# Patient Record
Sex: FEMALE | Race: WHITE | NOT HISPANIC OR LATINO | Employment: OTHER | URBAN - NONMETROPOLITAN AREA
[De-identification: names, ages, dates, MRNs, and addresses within clinical notes are randomized per-mention and may not be internally consistent; named-entity substitution may affect disease eponyms.]

---

## 2020-01-01 ENCOUNTER — TELEPHONE (OUTPATIENT)
Dept: OBSTETRICS AND GYNECOLOGY | Facility: CLINIC | Age: 65
End: 2020-01-01

## 2020-01-01 ENCOUNTER — OFFICE VISIT (OUTPATIENT)
Dept: OBSTETRICS AND GYNECOLOGY | Facility: CLINIC | Age: 65
End: 2020-01-01

## 2020-01-01 ENCOUNTER — APPOINTMENT (OUTPATIENT)
Dept: MAMMOGRAPHY | Facility: HOSPITAL | Age: 65
End: 2020-01-01

## 2020-01-01 ENCOUNTER — APPOINTMENT (OUTPATIENT)
Dept: BONE DENSITY | Facility: HOSPITAL | Age: 65
End: 2020-01-01

## 2020-01-01 VITALS
HEIGHT: 65 IN | WEIGHT: 293 LBS | BODY MASS INDEX: 48.82 KG/M2 | SYSTOLIC BLOOD PRESSURE: 136 MMHG | DIASTOLIC BLOOD PRESSURE: 86 MMHG

## 2020-01-01 DIAGNOSIS — Z12.31 ENCOUNTER FOR SCREENING MAMMOGRAM FOR MALIGNANT NEOPLASM OF BREAST: ICD-10-CM

## 2020-01-01 DIAGNOSIS — Z01.419 ENCOUNTER FOR GYNECOLOGICAL EXAMINATION WITHOUT ABNORMAL FINDING: Primary | ICD-10-CM

## 2020-01-01 DIAGNOSIS — Z78.9 NON-SMOKER: ICD-10-CM

## 2020-01-01 DIAGNOSIS — N95.0 PMB (POSTMENOPAUSAL BLEEDING): ICD-10-CM

## 2020-01-01 DIAGNOSIS — Z78.0 ENCOUNTER FOR OSTEOPOROSIS SCREENING IN ASYMPTOMATIC POSTMENOPAUSAL PATIENT: ICD-10-CM

## 2020-01-01 DIAGNOSIS — Z13.820 ENCOUNTER FOR OSTEOPOROSIS SCREENING IN ASYMPTOMATIC POSTMENOPAUSAL PATIENT: ICD-10-CM

## 2020-01-01 LAB
GEN CATEG CVX/VAG CYTO-IMP: NORMAL
HPV I/H RISK 4 DNA CVX QL PROBE+SIG AMP: NOT DETECTED
LAB AP CASE REPORT: NORMAL
LAB AP GYN ADDITIONAL INFORMATION: NORMAL
PATH INTERP SPEC-IMP: NORMAL
STAT OF ADQ CVX/VAG CYTO-IMP: NORMAL

## 2020-01-01 PROCEDURE — 87624 HPV HI-RISK TYP POOLED RSLT: CPT | Performed by: NURSE PRACTITIONER

## 2020-01-01 PROCEDURE — G0101 CA SCREEN;PELVIC/BREAST EXAM: HCPCS | Performed by: NURSE PRACTITIONER

## 2020-01-01 PROCEDURE — G0123 SCREEN CERV/VAG THIN LAYER: HCPCS | Performed by: NURSE PRACTITIONER

## 2020-08-07 ENCOUNTER — OFFICE VISIT (OUTPATIENT)
Dept: INTERNAL MEDICINE | Facility: CLINIC | Age: 65
End: 2020-08-07

## 2020-08-07 VITALS
SYSTOLIC BLOOD PRESSURE: 152 MMHG | HEART RATE: 89 BPM | HEIGHT: 65 IN | RESPIRATION RATE: 17 BRPM | TEMPERATURE: 98.2 F | OXYGEN SATURATION: 98 % | WEIGHT: 293 LBS | BODY MASS INDEX: 48.82 KG/M2 | DIASTOLIC BLOOD PRESSURE: 92 MMHG

## 2020-08-07 DIAGNOSIS — E03.9 HYPOTHYROIDISM, UNSPECIFIED TYPE: ICD-10-CM

## 2020-08-07 DIAGNOSIS — Z11.59 ENCOUNTER FOR HEPATITIS C SCREENING TEST FOR LOW RISK PATIENT: ICD-10-CM

## 2020-08-07 DIAGNOSIS — F41.9 ANXIETY: Primary | ICD-10-CM

## 2020-08-07 DIAGNOSIS — R06.2 WHEEZE: ICD-10-CM

## 2020-08-07 DIAGNOSIS — G62.9 NEUROPATHY: ICD-10-CM

## 2020-08-07 DIAGNOSIS — N93.8 DYSFUNCTIONAL UTERINE BLEEDING: ICD-10-CM

## 2020-08-07 DIAGNOSIS — Z00.00 HEALTHCARE MAINTENANCE: ICD-10-CM

## 2020-08-07 DIAGNOSIS — F51.01 PRIMARY INSOMNIA: ICD-10-CM

## 2020-08-07 DIAGNOSIS — K21.9 GASTROESOPHAGEAL REFLUX DISEASE, ESOPHAGITIS PRESENCE NOT SPECIFIED: ICD-10-CM

## 2020-08-07 DIAGNOSIS — E66.01 MORBIDLY OBESE (HCC): ICD-10-CM

## 2020-08-07 DIAGNOSIS — M79.7 FIBROMYALGIA: ICD-10-CM

## 2020-08-07 DIAGNOSIS — F32.A DEPRESSION, UNSPECIFIED DEPRESSION TYPE: ICD-10-CM

## 2020-08-07 DIAGNOSIS — R05.3 CHRONIC COUGH: ICD-10-CM

## 2020-08-07 PROCEDURE — 99204 OFFICE O/P NEW MOD 45 MIN: CPT | Performed by: INTERNAL MEDICINE

## 2020-08-07 RX ORDER — ALPRAZOLAM 0.5 MG/1
0.5 TABLET ORAL DAILY PRN
Qty: 30 TABLET | Refills: 2 | Status: SHIPPED | OUTPATIENT
Start: 2020-08-07 | End: 2021-01-01 | Stop reason: SDUPTHER

## 2020-08-07 RX ORDER — ALPRAZOLAM 0.5 MG/1
0.5 TABLET ORAL AS NEEDED
COMMUNITY
End: 2020-08-07 | Stop reason: SDUPTHER

## 2020-08-07 RX ORDER — PANTOPRAZOLE SODIUM 40 MG/1
40 TABLET, DELAYED RELEASE ORAL DAILY
Qty: 30 TABLET | Refills: 5 | Status: SHIPPED | OUTPATIENT
Start: 2020-08-07 | End: 2021-01-01 | Stop reason: SDUPTHER

## 2020-08-07 RX ORDER — GABAPENTIN 300 MG/1
300 CAPSULE ORAL 3 TIMES DAILY
Qty: 90 CAPSULE | Refills: 2 | Status: SHIPPED | OUTPATIENT
Start: 2020-08-07 | End: 2021-01-01

## 2020-08-07 RX ORDER — LEVOTHYROXINE SODIUM 0.07 MG/1
75 TABLET ORAL DAILY
COMMUNITY
End: 2020-08-07 | Stop reason: SDUPTHER

## 2020-08-07 RX ORDER — AMITRIPTYLINE HYDROCHLORIDE 100 MG/1
100 TABLET, FILM COATED ORAL NIGHTLY
COMMUNITY
End: 2020-08-07 | Stop reason: SDUPTHER

## 2020-08-07 RX ORDER — ZOLPIDEM TARTRATE 10 MG/1
10 TABLET ORAL NIGHTLY PRN
COMMUNITY
End: 2020-08-07 | Stop reason: SDUPTHER

## 2020-08-07 RX ORDER — AMITRIPTYLINE HYDROCHLORIDE 100 MG/1
100 TABLET, FILM COATED ORAL NIGHTLY
Qty: 30 TABLET | Refills: 5 | Status: SHIPPED | OUTPATIENT
Start: 2020-08-07 | End: 2021-01-01 | Stop reason: SDUPTHER

## 2020-08-07 RX ORDER — LEVOTHYROXINE SODIUM 0.07 MG/1
75 TABLET ORAL DAILY
Qty: 30 TABLET | Refills: 5 | Status: SHIPPED | OUTPATIENT
Start: 2020-08-07 | End: 2021-01-01 | Stop reason: SDUPTHER

## 2020-08-07 RX ORDER — ZOLPIDEM TARTRATE 10 MG/1
10 TABLET ORAL NIGHTLY PRN
Qty: 30 TABLET | Refills: 5 | Status: SHIPPED | OUTPATIENT
Start: 2020-08-07 | End: 2021-01-01 | Stop reason: SDUPTHER

## 2020-08-07 NOTE — PATIENT INSTRUCTIONS
-start gabapentin 300mg daily for one week then increase to three times a day  -you can mix hydrogen peroxide and water in a 50/50 split to help your ear wax.

## 2020-08-07 NOTE — PROGRESS NOTES
"Chief Complaint   Patient presents with   • Establish Care   • Ear Problem     Right ear stopped up   • Wheezing   • Med Refill         History:  Ángela Connell is a 64 y.o. female who presents today for evaluation of the above problems.      She presents today to establish care.  According to chart review she has a previous medical history for hypothyroidism, insomnia, anxiety, polyneuropathy, and recent medical history for wheezing and right ear issue.    She is from [place] but was living in [facility].  She moved back in March.    She has had numerous issues.  Currently, she has a chronic cough which started about 6 months ago.  She also has been having upper airway wheezing but no shortness of breath.  She had reflux for a long time and takes as needed omeprazole but not daily.    She has anxiety and panic attacks for which she takes Xanax \"only when I have to\".  She estimates taking 3-4 in a 1 week time span.  She gets nervous in cars and actually had to give up her 's license due to anxiety    She was diagnosed with polyneuropathy of her lower extremities.  She also has fibromyalgia but is not currently on any medication.  Gabapentin in the past was not successful in helping her and Lyrica caused too many side effects.    She reports her last colonoscopy is due in 2022.  Her mammogram was last done September 2018    She has had a D&C last year for dysfunctional uterine bleeding.  She was going to have more surgery but this had to be canceled due to a \"collapsed left lung\" and the fact that she moved back here to [place].    She is also having right ear stuffiness that did not resolve with over-the-counter earwax removal kit x1        HPI    ROS:  Review of Systems   Constitutional: Positive for activity change and fatigue. Negative for appetite change, fever and unexpected weight change.   HENT: Positive for hearing loss. Negative for ear discharge, nosebleeds, sore throat, tinnitus and trouble " swallowing.    Eyes: Negative for pain and visual disturbance.   Respiratory: Positive for cough and wheezing. Negative for chest tightness and shortness of breath.    Cardiovascular: Negative for chest pain, palpitations and leg swelling.   Gastrointestinal: Negative for abdominal pain, constipation, diarrhea, nausea and vomiting.        Reflux   Endocrine: Negative for cold intolerance and heat intolerance.   Genitourinary: Positive for vaginal bleeding. Negative for hematuria.   Musculoskeletal: Positive for arthralgias and myalgias. Negative for back pain, neck pain and neck stiffness.   Skin: Negative for rash and wound.   Neurological: Positive for numbness. Negative for dizziness, syncope, weakness, light-headedness and headaches.   Hematological: Negative for adenopathy. Does not bruise/bleed easily.   Psychiatric/Behavioral: Negative for agitation, behavioral problems and confusion.       No Known Allergies  Past Medical History:   Diagnosis Date   • Anxiety    • Polyneuropathy    • Thyroid disease      Past Surgical History:   Procedure Laterality Date   • GALLBLADDER SURGERY     • HYSTERECTOMY     • REPLACEMENT TOTAL KNEE       Family History   Problem Relation Age of Onset   • Cancer Mother    • Heart disease Mother    • Dementia Mother    • Cancer Father    • Heart disease Father    • Diabetes Sister    • Heart disease Maternal Grandfather    • Cancer Paternal Grandmother    • Heart disease Paternal Grandmother      Ángela  reports that she has never smoked. She has never used smokeless tobacco. She reports that she drinks alcohol. She reports that she does not use drugs.    I have reviewed and updated the above documentation (if necessary) including but not limited to chief complaint, ROS, PFSH, allergies and medications        Current Outpatient Medications:   •  ALPRAZolam (XANAX) 0.5 MG tablet, Take 1 tablet by mouth Daily As Needed for Anxiety., Disp: 30 tablet, Rfl: 2  •  amitriptyline (ELAVIL)  "100 MG tablet, Take 1 tablet by mouth Every Night., Disp: 30 tablet, Rfl: 5  •  levothyroxine (SYNTHROID, LEVOTHROID) 75 MCG tablet, Take 1 tablet by mouth Daily., Disp: 30 tablet, Rfl: 5  •  zolpidem (AMBIEN) 10 MG tablet, Take 1 tablet by mouth At Night As Needed for Sleep., Disp: 30 tablet, Rfl: 5  •  gabapentin (NEURONTIN) 300 MG capsule, Take 1 capsule by mouth 3 (Three) Times a Day., Disp: 90 capsule, Rfl: 2  •  pantoprazole (Protonix) 40 MG EC tablet, Take 1 tablet by mouth Daily., Disp: 30 tablet, Rfl: 5      OBJECTIVE:  Visit Vitals  /92 (BP Location: Left arm, Patient Position: Sitting, Cuff Size: Adult)   Pulse 89   Temp 98.2 °F (36.8 °C) (Oral)   Resp 17   Ht 165.1 cm (65\")   Wt (!) 140 kg (308 lb 9.6 oz)   SpO2 98%   Breastfeeding No   BMI 51.35 kg/m²      Physical Exam   Constitutional: She appears well-developed and well-nourished. No distress.   HENT:   Head: Normocephalic and atraumatic.   Right Ear: External ear normal.   Left Ear: External ear normal.   Left TM clear, right cerumen impaction   Eyes: Pupils are equal, round, and reactive to light. EOM are normal.   Neck: Phonation normal. No thyromegaly present.   Cardiovascular: Normal rate, regular rhythm, normal heart sounds and intact distal pulses. Exam reveals no friction rub.   No murmur heard.  No lower extremity edema   Pulmonary/Chest: Effort normal and breath sounds normal. No stridor. No respiratory distress. She has no wheezes. She has no rales. She exhibits no tenderness.   No wheezing at all, no stridor   Abdominal: Soft. Bowel sounds are normal. She exhibits no distension (Obese) and no mass. There is no tenderness. There is no rebound and no guarding.   Neurological: She is alert.   Skin: She is not diaphoretic. No erythema. No pallor.   Psychiatric: She has a normal mood and affect. Her behavior is normal. Judgment and thought content normal.   Vitals reviewed.      MDM    Assessment/Plan    Ángela was seen today for " establish care, ear problem, wheezing and med refill.    Diagnoses and all orders for this visit:    Anxiety  -     ALPRAZolam (XANAX) 0.5 MG tablet; Take 1 tablet by mouth Daily As Needed for Anxiety.    Hypothyroidism, unspecified type  -     levothyroxine (SYNTHROID, LEVOTHROID) 75 MCG tablet; Take 1 tablet by mouth Daily.  -     TSH; Future    Primary insomnia  -     zolpidem (AMBIEN) 10 MG tablet; Take 1 tablet by mouth At Night As Needed for Sleep.  -     amitriptyline (ELAVIL) 100 MG tablet; Take 1 tablet by mouth Every Night.    Depression, unspecified depression type    Fibromyalgia  -     gabapentin (NEURONTIN) 300 MG capsule; Take 1 capsule by mouth 3 (Three) Times a Day.    Wheeze  -     XR Chest PA & Lateral; Future    Chronic cough  -     XR Chest PA & Lateral; Future    Gastroesophageal reflux disease, esophagitis presence not specified  -     pantoprazole (Protonix) 40 MG EC tablet; Take 1 tablet by mouth Daily.    Morbidly obese (CMS/[facility])    Encounter for hepatitis C screening test for low risk patient  -     Hepatitis C Antibody; Future    Healthcare maintenance  -     CBC & Differential; Future  -     Comprehensive Metabolic Panel; Future  -     Hemoglobin A1c; Future  -     Lipid Panel; Future    Neuropathy  -     gabapentin (NEURONTIN) 300 MG capsule; Take 1 capsule by mouth 3 (Three) Times a Day.    Dysfunctional uterine bleeding  -     Ambulatory Referral to Gynecology      Given how her anxiety has affected her activities of daily living I will refill the Xanax.  She is not on a daily but does take 3-4 times per week.  I would like to check labs as above.    To help her neuropathy pain and fibromyalgia I am going to start gabapentin 300 mg 3 times a day.  She was instructed to start once a day for the first 1 week.    She has a chronic cough and wheeze which I suspect is from and treated reflux disease.  On exam she does not have any wheezing or stridor.  I would like to treat with Protonix  daily for 3 months.  I would like to check a chest x-ray as well.  If symptoms continue with treatment with PPI I will refer to ENT    We discussed her weight and she has difficult time losing weight due to hard time exercising.    Refer to gynecology for dysfunctional uterine bleeding with previous polypectomy    Follow-up 3 months or sooner if clinically indicated     Patient's Body mass index is 51.35 kg/m². BMI is above normal parameters. Recommendations include: exercise counseling and nutrition counseling.      Education materials and an After Visit Summary were printed and given to the patient at discharge.  Return in about 3 months (around 11/7/2020) for Recheck.         ELIZABETH Steen MD   1:38 PM  8/7/2020

## 2020-08-17 ENCOUNTER — HOSPITAL ENCOUNTER (OUTPATIENT)
Dept: GENERAL RADIOLOGY | Facility: HOSPITAL | Age: 65
Discharge: HOME | End: 2020-08-17
Admitting: INTERNAL MEDICINE

## 2020-08-17 ENCOUNTER — LAB (OUTPATIENT)
Dept: LAB | Facility: HOSPITAL | Age: 65
End: 2020-08-17

## 2020-08-17 DIAGNOSIS — E03.9 HYPOTHYROIDISM, UNSPECIFIED TYPE: ICD-10-CM

## 2020-08-17 DIAGNOSIS — R06.2 WHEEZE: ICD-10-CM

## 2020-08-17 DIAGNOSIS — Z11.59 ENCOUNTER FOR HEPATITIS C SCREENING TEST FOR LOW RISK PATIENT: ICD-10-CM

## 2020-08-17 DIAGNOSIS — Z00.00 HEALTHCARE MAINTENANCE: ICD-10-CM

## 2020-08-17 DIAGNOSIS — R05.3 CHRONIC COUGH: ICD-10-CM

## 2020-08-17 LAB
ALBUMIN SERPL-MCNC: 3.9 G/DL (ref 3.5–5.2)
ALBUMIN/GLOB SERPL: 1.3 G/DL
ALP SERPL-CCNC: 117 U/L (ref 39–117)
ALT SERPL W P-5'-P-CCNC: 16 U/L (ref 1–33)
ANION GAP SERPL CALCULATED.3IONS-SCNC: 10.7 MMOL/L (ref 5–15)
AST SERPL-CCNC: 13 U/L (ref 1–32)
BASOPHILS # BLD AUTO: 0.02 10*3/MM3 (ref 0–0.2)
BASOPHILS NFR BLD AUTO: 0.4 % (ref 0–1.5)
BILIRUB SERPL-MCNC: 0.4 MG/DL (ref 0–1.2)
BUN SERPL-MCNC: 8 MG/DL (ref 8–23)
BUN/CREAT SERPL: 8.8 (ref 7–25)
CALCIUM SPEC-SCNC: 9.1 MG/DL (ref 8.6–10.5)
CHLORIDE SERPL-SCNC: 102 MMOL/L (ref 98–107)
CHOLEST SERPL-MCNC: 218 MG/DL (ref 0–200)
CO2 SERPL-SCNC: 25.3 MMOL/L (ref 22–29)
CREAT SERPL-MCNC: 0.91 MG/DL (ref 0.57–1)
DEPRECATED RDW RBC AUTO: 40.3 FL (ref 37–54)
EOSINOPHIL # BLD AUTO: 0.1 10*3/MM3 (ref 0–0.4)
EOSINOPHIL NFR BLD AUTO: 1.9 % (ref 0.3–6.2)
ERYTHROCYTE [DISTWIDTH] IN BLOOD BY AUTOMATED COUNT: 13.1 % (ref 12.3–15.4)
GFR SERPL CREATININE-BSD FRML MDRD: 62 ML/MIN/1.73
GLOBULIN UR ELPH-MCNC: 3.1 GM/DL
GLUCOSE SERPL-MCNC: 96 MG/DL (ref 65–99)
HBA1C MFR BLD: 5.6 % (ref 4.8–5.6)
HCT VFR BLD AUTO: 43.7 % (ref 34–46.6)
HCV AB SER DONR QL: NORMAL
HDLC SERPL-MCNC: 53 MG/DL (ref 40–60)
HGB BLD-MCNC: 14.3 G/DL (ref 12–15.9)
IMM GRANULOCYTES # BLD AUTO: 0.02 10*3/MM3 (ref 0–0.05)
IMM GRANULOCYTES NFR BLD AUTO: 0.4 % (ref 0–0.5)
LDLC SERPL CALC-MCNC: 147 MG/DL (ref 0–100)
LDLC/HDLC SERPL: 2.78 {RATIO}
LYMPHOCYTES # BLD AUTO: 0.89 10*3/MM3 (ref 0.7–3.1)
LYMPHOCYTES NFR BLD AUTO: 17.2 % (ref 19.6–45.3)
MCH RBC QN AUTO: 28.5 PG (ref 26.6–33)
MCHC RBC AUTO-ENTMCNC: 32.7 G/DL (ref 31.5–35.7)
MCV RBC AUTO: 87.1 FL (ref 79–97)
MONOCYTES # BLD AUTO: 0.46 10*3/MM3 (ref 0.1–0.9)
MONOCYTES NFR BLD AUTO: 8.9 % (ref 5–12)
NEUTROPHILS NFR BLD AUTO: 3.67 10*3/MM3 (ref 1.7–7)
NEUTROPHILS NFR BLD AUTO: 71.2 % (ref 42.7–76)
NRBC BLD AUTO-RTO: 0 /100 WBC (ref 0–0.2)
PLATELET # BLD AUTO: 285 10*3/MM3 (ref 140–450)
PMV BLD AUTO: 10.3 FL (ref 6–12)
POTASSIUM SERPL-SCNC: 3.9 MMOL/L (ref 3.5–5.2)
PROT SERPL-MCNC: 7 G/DL (ref 6–8.5)
RBC # BLD AUTO: 5.02 10*6/MM3 (ref 3.77–5.28)
SODIUM SERPL-SCNC: 138 MMOL/L (ref 136–145)
TRIGL SERPL-MCNC: 89 MG/DL (ref 0–150)
TSH SERPL DL<=0.05 MIU/L-ACNC: 3.01 UIU/ML (ref 0.27–4.2)
VLDLC SERPL-MCNC: 17.8 MG/DL (ref 5–40)
WBC # BLD AUTO: 5.16 10*3/MM3 (ref 3.4–10.8)

## 2020-08-17 PROCEDURE — 80061 LIPID PANEL: CPT | Performed by: INTERNAL MEDICINE

## 2020-08-17 PROCEDURE — 86803 HEPATITIS C AB TEST: CPT | Performed by: INTERNAL MEDICINE

## 2020-08-17 PROCEDURE — 80053 COMPREHEN METABOLIC PANEL: CPT | Performed by: INTERNAL MEDICINE

## 2020-08-17 PROCEDURE — 71046 X-RAY EXAM CHEST 2 VIEWS: CPT

## 2020-08-17 PROCEDURE — 84443 ASSAY THYROID STIM HORMONE: CPT | Performed by: INTERNAL MEDICINE

## 2020-08-17 PROCEDURE — 36415 COLL VENOUS BLD VENIPUNCTURE: CPT

## 2020-08-17 PROCEDURE — 85025 COMPLETE CBC W/AUTO DIFF WBC: CPT | Performed by: INTERNAL MEDICINE

## 2020-08-17 PROCEDURE — 83036 HEMOGLOBIN GLYCOSYLATED A1C: CPT | Performed by: INTERNAL MEDICINE

## 2020-12-03 NOTE — PATIENT INSTRUCTIONS
"BMI for Adults  What is BMI?  Body mass index (BMI) is a number that is calculated from a person's weight and height. BMI can help estimate how much of a person's weight is composed of fat. BMI does not measure body fat directly. Rather, it is an alternative to procedures that directly measure body fat, which can be difficult and expensive.  BMI can help identify people who may be at higher risk for certain medical problems.  What are BMI measurements used for?  BMI is used as a screening tool to identify possible weight problems. It helps determine whether a person is obese, overweight, a healthy weight, or underweight.  BMI is useful for:  · Identifying a weight problem that may be related to a medical condition or may increase the risk for medical problems.  · Promoting changes, such as changes in diet and exercise, to help reach a healthy weight. BMI screening can be repeated to see if these changes are working.  How is BMI calculated?  BMI involves measuring your weight in relation to your height. Both height and weight are measured, and the BMI is calculated from those numbers. This can be done either in English (U[place] or metric measurements. Note that charts and online BMI calculators are available to help you find your BMI quickly and easily without having to do these calculations yourself.  To calculate your BMI in English (U.S[place] measurements:    1. Measure your weight in pounds (lb).  2. Multiply the number of pounds by 703.  ? For example, for a person who weighs 180 lb, multiply that number by 703, which equals 126,540.  3. Measure your height in inches. Then multiply that number by itself to get a measurement called \"inches squared.\"  ? For example, for a person who is 70 inches tall, the \"inches squared\" measurement is 70 inches x 70 inches, which equals 4,900 inches squared.  4. Divide the total from step 2 (number of lb x 703) by the total from step 3 (inches squared): 126,540 ÷ 4,900 = 25.8. This is " "your BMI.  To calculate your BMI in metric measurements:  1. Measure your weight in kilograms (kg).  2. Measure your height in meters (m). Then multiply that number by itself to get a measurement called \"meters squared.\"  ? For example, for a person who is 1.75 m tall, the \"meters squared\" measurement is 1.75 m x 1.75 m, which is equal to 3.1 meters squared.  3. Divide the number of kilograms (your weight) by the meters squared number. In this example: 70 ÷ 3.1 = 22.6. This is your BMI.  What do the results mean?  BMI charts are used to identify whether you are underweight, normal weight, overweight, or obese. The following guidelines will be used:  · Underweight: BMI less than 18.5.  · Normal weight: BMI between 18.5 and 24.9.  · Overweight: BMI between 25 and 29.9.  · Obese: BMI of 30 or above.  Keep these notes in mind:  · Weight includes both fat and muscle, so someone with a muscular build, such as an athlete, may have a BMI that is higher than 24.9. In cases like these, BMI is not an accurate measure of body fat.  · To determine if excess body fat is the cause of a BMI of 25 or higher, further assessments may need to be done by a health care provider.  · BMI is usually interpreted in the same way for men and women.  Where to find more information  For more information about BMI, including tools to quickly calculate your BMI, go to these websites:  · Centers for Disease Control and Prevention: www.cdc.gov  · American Heart Association: www.heart.org  · National Heart, Lung, and Blood [place]: www.nhlbi.nih.gov  Summary  · Body mass index (BMI) is a number that is calculated from a person's weight and height.  · BMI may help estimate how much of a person's weight is composed of fat. BMI can help identify those who may be at higher risk for certain medical problems.  · BMI can be measured using English measurements or metric measurements.  · BMI charts are used to identify whether you are underweight, normal " weight, overweight, or obese.  This information is not intended to replace advice given to you by your health care provider. Make sure you discuss any questions you have with your health care provider.  Document Revised: 09/09/2020 Document Reviewed: 07/17/2020  Elsevier Patient Education © 2020 [employer]

## 2021-01-01 ENCOUNTER — HOSPITAL ENCOUNTER (OUTPATIENT)
Dept: INFUSION THERAPY | Facility: HOSPITAL | Age: 66
Discharge: HOME | End: 2021-09-01
Admitting: INTERNAL MEDICINE

## 2021-01-01 ENCOUNTER — TRANSCRIBE ORDERS (OUTPATIENT)
Dept: LAB | Facility: HOSPITAL | Age: 66
End: 2021-01-01

## 2021-01-01 ENCOUNTER — OFFICE VISIT (OUTPATIENT)
Dept: INTERNAL MEDICINE | Facility: CLINIC | Age: 66
End: 2021-01-01

## 2021-01-01 ENCOUNTER — TELEPHONE (OUTPATIENT)
Dept: INTERNAL MEDICINE | Facility: CLINIC | Age: 66
End: 2021-01-01

## 2021-01-01 ENCOUNTER — HOSPITAL ENCOUNTER (OUTPATIENT)
Dept: CT IMAGING | Facility: HOSPITAL | Age: 66
Discharge: HOME | End: 2021-08-18
Admitting: INTERNAL MEDICINE

## 2021-01-01 ENCOUNTER — HOSPITAL ENCOUNTER (OUTPATIENT)
Dept: PULMONOLOGY | Facility: HOSPITAL | Age: 66
Discharge: HOME | End: 2021-03-30
Admitting: INTERNAL MEDICINE

## 2021-01-01 ENCOUNTER — HOSPITAL ENCOUNTER (EMERGENCY)
Facility: HOSPITAL | Age: 66
End: 2021-09-03
Attending: EMERGENCY MEDICINE | Admitting: EMERGENCY MEDICINE

## 2021-01-01 ENCOUNTER — APPOINTMENT (OUTPATIENT)
Dept: PULMONOLOGY | Facility: HOSPITAL | Age: 66
End: 2021-01-01

## 2021-01-01 ENCOUNTER — LAB (OUTPATIENT)
Dept: LAB | Facility: HOSPITAL | Age: 66
End: 2021-01-01

## 2021-01-01 VITALS
TEMPERATURE: 100 F | SYSTOLIC BLOOD PRESSURE: 138 MMHG | RESPIRATION RATE: 18 BRPM | HEIGHT: 65 IN | OXYGEN SATURATION: 90 % | HEART RATE: 89 BPM | DIASTOLIC BLOOD PRESSURE: 82 MMHG | WEIGHT: 293 LBS | BODY MASS INDEX: 48.82 KG/M2

## 2021-01-01 VITALS
TEMPERATURE: 98.4 F | WEIGHT: 293 LBS | HEART RATE: 113 BPM | OXYGEN SATURATION: 96 % | RESPIRATION RATE: 18 BRPM | BODY MASS INDEX: 48.82 KG/M2 | DIASTOLIC BLOOD PRESSURE: 70 MMHG | HEIGHT: 65 IN | SYSTOLIC BLOOD PRESSURE: 128 MMHG

## 2021-01-01 VITALS — HEART RATE: 116 BPM | OXYGEN SATURATION: 74 %

## 2021-01-01 VITALS
TEMPERATURE: 98.2 F | HEIGHT: 65 IN | SYSTOLIC BLOOD PRESSURE: 124 MMHG | OXYGEN SATURATION: 98 % | BODY MASS INDEX: 48.82 KG/M2 | HEART RATE: 110 BPM | DIASTOLIC BLOOD PRESSURE: 74 MMHG | WEIGHT: 293 LBS

## 2021-01-01 DIAGNOSIS — R05.3 CHRONIC COUGH: Primary | ICD-10-CM

## 2021-01-01 DIAGNOSIS — R05.3 CHRONIC COUGH: ICD-10-CM

## 2021-01-01 DIAGNOSIS — J98.4 RESTRICTIVE LUNG DISEASE: ICD-10-CM

## 2021-01-01 DIAGNOSIS — G47.19 EXCESSIVE DAYTIME SLEEPINESS: Primary | ICD-10-CM

## 2021-01-01 DIAGNOSIS — E03.9 HYPOTHYROIDISM, UNSPECIFIED TYPE: ICD-10-CM

## 2021-01-01 DIAGNOSIS — H81.10 BENIGN PAROXYSMAL POSITIONAL VERTIGO, UNSPECIFIED LATERALITY: ICD-10-CM

## 2021-01-01 DIAGNOSIS — Z01.818 PREOP TESTING: Primary | ICD-10-CM

## 2021-01-01 DIAGNOSIS — I46.9 CARDIAC ARREST (HCC): Primary | ICD-10-CM

## 2021-01-01 DIAGNOSIS — K21.9 GASTROESOPHAGEAL REFLUX DISEASE: ICD-10-CM

## 2021-01-01 DIAGNOSIS — M79.7 FIBROMYALGIA: ICD-10-CM

## 2021-01-01 DIAGNOSIS — F41.9 ANXIETY: ICD-10-CM

## 2021-01-01 DIAGNOSIS — F51.01 PRIMARY INSOMNIA: ICD-10-CM

## 2021-01-01 DIAGNOSIS — G62.9 NEUROPATHY: ICD-10-CM

## 2021-01-01 DIAGNOSIS — M51.36 DDD (DEGENERATIVE DISC DISEASE), LUMBAR: ICD-10-CM

## 2021-01-01 LAB
ARTERIAL PATENCY WRIST A: POSITIVE
ATMOSPHERIC PRESS: 751 MMHG
BASE EXCESS BLDA CALC-SCNC: 1.1 MMOL/L (ref 0–2)
BDY SITE: ABNORMAL
BODY TEMPERATURE: 37 C
HCO3 BLDA-SCNC: 26.4 MMOL/L (ref 20–26)
Lab: ABNORMAL
MODALITY: ABNORMAL
PCO2 BLDA: 43.3 MM HG (ref 35–45)
PCO2 TEMP ADJ BLD: 43.3 MM HG (ref 35–45)
PH BLDA: 7.39 PH UNITS (ref 7.35–7.45)
PH, TEMP CORRECTED: 7.39 PH UNITS (ref 7.35–7.45)
PO2 BLDA: 73.2 MM HG (ref 83–108)
PO2 TEMP ADJ BLD: 73.2 MM HG (ref 83–108)
SAO2 % BLDCOA: 96.1 % (ref 94–99)
SARS-COV-2 ORF1AB RESP QL NAA+PROBE: NOT DETECTED
VENTILATOR MODE: ABNORMAL

## 2021-01-01 PROCEDURE — 25010000006 INJECTION, CASIRIVIMAB AND IMDEVIMAB, 1200 MG: Performed by: INTERNAL MEDICINE

## 2021-01-01 PROCEDURE — 94010 BREATHING CAPACITY TEST: CPT

## 2021-01-01 PROCEDURE — 94799 UNLISTED PULMONARY SVC/PX: CPT

## 2021-01-01 PROCEDURE — 87635 SARS-COV-2 COVID-19 AMP PRB: CPT | Performed by: FAMILY MEDICINE

## 2021-01-01 PROCEDURE — C9803 HOPD COVID-19 SPEC COLLECT: HCPCS | Performed by: INTERNAL MEDICINE

## 2021-01-01 PROCEDURE — 36600 WITHDRAWAL OF ARTERIAL BLOOD: CPT

## 2021-01-01 PROCEDURE — 99284 EMERGENCY DEPT VISIT MOD MDM: CPT

## 2021-01-01 PROCEDURE — 25010000002 EPINEPHRINE 1 MG/10ML SOLUTION PREFILLED SYRINGE: Performed by: EMERGENCY MEDICINE

## 2021-01-01 PROCEDURE — 71250 CT THORAX DX C-: CPT

## 2021-01-01 PROCEDURE — 92950 HEART/LUNG RESUSCITATION CPR: CPT

## 2021-01-01 PROCEDURE — 99283 EMERGENCY DEPT VISIT LOW MDM: CPT

## 2021-01-01 PROCEDURE — 82803 BLOOD GASES ANY COMBINATION: CPT

## 2021-01-01 PROCEDURE — 94726 PLETHYSMOGRAPHY LUNG VOLUMES: CPT | Performed by: INTERNAL MEDICINE

## 2021-01-01 PROCEDURE — U0004 COV-19 TEST NON-CDC HGH THRU: HCPCS | Performed by: INTERNAL MEDICINE

## 2021-01-01 PROCEDURE — M0243 CASIRIVI AND IMDEVI INFUSION: HCPCS | Performed by: INTERNAL MEDICINE

## 2021-01-01 PROCEDURE — 94726 PLETHYSMOGRAPHY LUNG VOLUMES: CPT

## 2021-01-01 PROCEDURE — 99214 OFFICE O/P EST MOD 30 MIN: CPT | Performed by: INTERNAL MEDICINE

## 2021-01-01 PROCEDURE — 94010 BREATHING CAPACITY TEST: CPT | Performed by: INTERNAL MEDICINE

## 2021-01-01 PROCEDURE — 25010000002 EPINEPHRINE 1 MG/10ML SOLUTION PREFILLED SYRINGE

## 2021-01-01 PROCEDURE — 31500 INSERT EMERGENCY AIRWAY: CPT

## 2021-01-01 PROCEDURE — 96413 CHEMO IV INFUSION 1 HR: CPT

## 2021-01-01 PROCEDURE — 94729 DIFFUSING CAPACITY: CPT | Performed by: INTERNAL MEDICINE

## 2021-01-01 PROCEDURE — 94729 DIFFUSING CAPACITY: CPT

## 2021-01-01 RX ORDER — ZOLPIDEM TARTRATE 10 MG/1
10 TABLET ORAL NIGHTLY PRN
Qty: 30 TABLET | Refills: 5 | Status: SHIPPED | OUTPATIENT
Start: 2021-01-01 | End: 2021-01-01 | Stop reason: SDUPTHER

## 2021-01-01 RX ORDER — ZOLPIDEM TARTRATE 10 MG/1
10 TABLET ORAL NIGHTLY PRN
Qty: 30 TABLET | Refills: 5 | Status: SHIPPED | OUTPATIENT
Start: 2021-01-01

## 2021-01-01 RX ORDER — ALPRAZOLAM 0.5 MG/1
0.5 TABLET ORAL DAILY PRN
Qty: 30 TABLET | Refills: 2 | Status: SHIPPED | OUTPATIENT
Start: 2021-01-01 | End: 2021-01-01 | Stop reason: SDUPTHER

## 2021-01-01 RX ORDER — ALPRAZOLAM 0.5 MG/1
0.5 TABLET ORAL DAILY PRN
Qty: 30 TABLET | Refills: 2 | Status: SHIPPED | OUTPATIENT
Start: 2021-01-01

## 2021-01-01 RX ORDER — PANTOPRAZOLE SODIUM 40 MG/1
40 TABLET, DELAYED RELEASE ORAL DAILY
Qty: 30 TABLET | Refills: 5 | Status: SHIPPED | OUTPATIENT
Start: 2021-01-01

## 2021-01-01 RX ORDER — ALBUTEROL SULFATE 90 UG/1
2 AEROSOL, METERED RESPIRATORY (INHALATION) EVERY 4 HOURS PRN
Qty: 18 G | Refills: 1 | Status: SHIPPED | OUTPATIENT
Start: 2021-01-01 | End: 2021-01-01 | Stop reason: SDUPTHER

## 2021-01-01 RX ORDER — LEVOTHYROXINE SODIUM 0.07 MG/1
75 TABLET ORAL DAILY
Qty: 30 TABLET | Refills: 5 | Status: SHIPPED | OUTPATIENT
Start: 2021-01-01 | End: 2021-01-01 | Stop reason: SDUPTHER

## 2021-01-01 RX ORDER — AMITRIPTYLINE HYDROCHLORIDE 100 MG/1
100 TABLET, FILM COATED ORAL NIGHTLY
Qty: 30 TABLET | Refills: 5 | Status: SHIPPED | OUTPATIENT
Start: 2021-01-01

## 2021-01-01 RX ORDER — METHYLPREDNISOLONE SODIUM SUCCINATE 125 MG/2ML
125 INJECTION, POWDER, LYOPHILIZED, FOR SOLUTION INTRAMUSCULAR; INTRAVENOUS ONCE AS NEEDED
Status: DISCONTINUED | OUTPATIENT
Start: 2021-01-01 | End: 2021-01-01 | Stop reason: HOSPADM

## 2021-01-01 RX ORDER — LEVOTHYROXINE SODIUM 0.07 MG/1
75 TABLET ORAL DAILY
Qty: 30 TABLET | Refills: 5 | Status: SHIPPED | OUTPATIENT
Start: 2021-01-01

## 2021-01-01 RX ORDER — EPINEPHRINE 0.3 MG/.3ML
0.3 INJECTION SUBCUTANEOUS ONCE AS NEEDED
Status: DISCONTINUED | OUTPATIENT
Start: 2021-01-01 | End: 2021-01-01 | Stop reason: HOSPADM

## 2021-01-01 RX ORDER — SODIUM CHLORIDE 9 MG/ML
30 INJECTION, SOLUTION INTRAVENOUS ONCE
Status: COMPLETED | OUTPATIENT
Start: 2021-01-01 | End: 2021-01-01

## 2021-01-01 RX ORDER — AMITRIPTYLINE HYDROCHLORIDE 100 MG/1
100 TABLET, FILM COATED ORAL NIGHTLY
Qty: 30 TABLET | Refills: 5 | Status: SHIPPED | OUTPATIENT
Start: 2021-01-01 | End: 2021-01-01 | Stop reason: SDUPTHER

## 2021-01-01 RX ORDER — EPINEPHRINE 0.1 MG/ML
SYRINGE (ML) INJECTION
Status: COMPLETED | OUTPATIENT
Start: 2021-01-01 | End: 2021-01-01

## 2021-01-01 RX ORDER — DIPHENHYDRAMINE HYDROCHLORIDE 50 MG/ML
50 INJECTION INTRAMUSCULAR; INTRAVENOUS ONCE AS NEEDED
Status: DISCONTINUED | OUTPATIENT
Start: 2021-01-01 | End: 2021-01-01 | Stop reason: HOSPADM

## 2021-01-01 RX ORDER — GABAPENTIN 300 MG/1
600 CAPSULE ORAL 3 TIMES DAILY
Qty: 180 CAPSULE | Refills: 2 | Status: SHIPPED | OUTPATIENT
Start: 2021-01-01 | End: 2021-01-01

## 2021-01-01 RX ORDER — ALBUTEROL SULFATE 90 UG/1
2 AEROSOL, METERED RESPIRATORY (INHALATION) EVERY 4 HOURS PRN
Qty: 18 G | Refills: 1 | Status: SHIPPED | OUTPATIENT
Start: 2021-01-01

## 2021-01-01 RX ADMIN — EPINEPHRINE 1 MG: 0.1 INJECTION INTRACARDIAC; INTRAVENOUS at 11:37

## 2021-01-01 RX ADMIN — CASIRIVIMAB AND IMDEVIMAB: 600; 600 INJECTION, SOLUTION, CONCENTRATE INTRAVENOUS at 13:29

## 2021-01-01 RX ADMIN — EPINEPHRINE 1 MG: 0.1 INJECTION INTRACARDIAC; INTRAVENOUS at 11:35

## 2021-01-01 RX ADMIN — SODIUM CHLORIDE 30 ML: 9 INJECTION, SOLUTION INTRAVENOUS at 13:49

## 2021-01-18 PROBLEM — M51.36 DDD (DEGENERATIVE DISC DISEASE), LUMBAR: Status: ACTIVE | Noted: 2021-01-01

## 2021-01-18 NOTE — PATIENT INSTRUCTIONS
-for the first week take gabapentin 300mg three times a day and then increase to 600mg three times a day after one week.

## 2021-01-18 NOTE — PROGRESS NOTES
"Chief Complaint   Patient presents with   • Cough     pt states she had had cough for almost a year   • Pain     pt states she cannot do anything d/t being in so much pain all over         History:  Ángela Connell is a 65 y.o. female who presents today for evaluation of the above problems.    She is having to acute issues.  Her cough has been getting worse and she is also having hoarseness.  With strep at night.  She is on Protonix but does not feel like reflux.  Previous chest x-ray was normal.  When she gets a coughing episode she will hear the \"whistle in my throat\".  No chest pain, palpitations or shortness of breath    She has fibromyalgia and degenerative disc disease.  Gabapentin 300 mg 3 times a day did not help her very much so she stopped taking it when refill ran out.  She is in so much pain that she has to sit down.  She cannot shop and it is depressing for her.  No lower extremity weakness or numbness.  Also has other joint discomforts.  Discussed possible referral to pain management but she declines at this time    HPI    ROS:  Review of Systems   Constitutional: Positive for activity change. Negative for fatigue and fever.   Respiratory: Positive for cough. Negative for shortness of breath.    Cardiovascular: Negative for chest pain, palpitations and leg swelling.   Gastrointestinal: Negative.    Musculoskeletal: Positive for arthralgias and gait problem.         Current Outpatient Medications:   •  ALPRAZolam (XANAX) 0.5 MG tablet, Take 1 tablet by mouth Daily As Needed for Anxiety., Disp: 30 tablet, Rfl: 2  •  amitriptyline (ELAVIL) 100 MG tablet, Take 1 tablet by mouth Every Night., Disp: 30 tablet, Rfl: 5  •  levothyroxine (SYNTHROID, LEVOTHROID) 75 MCG tablet, Take 1 tablet by mouth Daily., Disp: 30 tablet, Rfl: 5  •  pantoprazole (Protonix) 40 MG EC tablet, Take 1 tablet by mouth Daily., Disp: 30 tablet, Rfl: 5  •  zolpidem (AMBIEN) 10 MG tablet, Take 1 tablet by mouth At Night As Needed for " Sleep., Disp: 30 tablet, Rfl: 5  •  albuterol sulfate  (90 Base) MCG/ACT inhaler, Inhale 2 puffs Every 4 (Four) Hours As Needed for Wheezing., Disp: 18 g, Rfl: 1  •  gabapentin (NEURONTIN) 300 MG capsule, Take 2 capsules by mouth 3 (Three) Times a Day., Disp: 180 capsule, Rfl: 2    Lab Results   Component Value Date    GLUCOSE 96 08/17/2020    BUN 8 08/17/2020    CREATININE 0.91 08/17/2020    EGFRIFNONA 62 08/17/2020    BCR 8.8 08/17/2020    K 3.9 08/17/2020    CO2 25.3 08/17/2020    CALCIUM 9.1 08/17/2020    ALBUMIN 3.90 08/17/2020    AST 13 08/17/2020    ALT 16 08/17/2020       WBC   Date Value Ref Range Status   08/17/2020 5.16 3.40 - 10.80 10*3/mm3 Final     RBC   Date Value Ref Range Status   08/17/2020 5.02 3.77 - 5.28 10*6/mm3 Final     Hemoglobin   Date Value Ref Range Status   08/17/2020 14.3 12.0 - 15.9 g/dL Final     Hematocrit   Date Value Ref Range Status   08/17/2020 43.7 34.0 - 46.6 % Final     MCV   Date Value Ref Range Status   08/17/2020 87.1 79.0 - 97.0 fL Final     MCH   Date Value Ref Range Status   08/17/2020 28.5 26.6 - 33.0 pg Final     MCHC   Date Value Ref Range Status   08/17/2020 32.7 31.5 - 35.7 g/dL Final     RDW   Date Value Ref Range Status   08/17/2020 13.1 12.3 - 15.4 % Final     RDW-SD   Date Value Ref Range Status   08/17/2020 40.3 37.0 - 54.0 fl Final     MPV   Date Value Ref Range Status   08/17/2020 10.3 6.0 - 12.0 fL Final     Platelets   Date Value Ref Range Status   08/17/2020 285 140 - 450 10*3/mm3 Final     Neutrophil %   Date Value Ref Range Status   08/17/2020 71.2 42.7 - 76.0 % Final     Lymphocyte %   Date Value Ref Range Status   08/17/2020 17.2 (L) 19.6 - 45.3 % Final     Monocyte %   Date Value Ref Range Status   08/17/2020 8.9 5.0 - 12.0 % Final     Eosinophil %   Date Value Ref Range Status   08/17/2020 1.9 0.3 - 6.2 % Final     Basophil %   Date Value Ref Range Status   08/17/2020 0.4 0.0 - 1.5 % Final     Immature Grans %   Date Value Ref Range Status  "  08/17/2020 0.4 0.0 - 0.5 % Final     Neutrophils, Absolute   Date Value Ref Range Status   08/17/2020 3.67 1.70 - 7.00 10*3/mm3 Final     Lymphocytes, Absolute   Date Value Ref Range Status   08/17/2020 0.89 0.70 - 3.10 10*3/mm3 Final     Monocytes, Absolute   Date Value Ref Range Status   08/17/2020 0.46 0.10 - 0.90 10*3/mm3 Final     Eosinophils, Absolute   Date Value Ref Range Status   08/17/2020 0.10 0.00 - 0.40 10*3/mm3 Final     Basophils, Absolute   Date Value Ref Range Status   08/17/2020 0.02 0.00 - 0.20 10*3/mm3 Final     Immature Grans, Absolute   Date Value Ref Range Status   08/17/2020 0.02 0.00 - 0.05 10*3/mm3 Final     nRBC   Date Value Ref Range Status   08/17/2020 0.0 0.0 - 0.2 /100 WBC Final         OBJECTIVE:  Visit Vitals  /74 (BP Location: Right arm, Patient Position: Sitting, Cuff Size: Adult)   Pulse 110   Temp 98.2 °F (36.8 °C) (Oral)   Ht 165.1 cm (65\")   Wt (!) 142 kg (313 lb 9.6 oz)   SpO2 98%   BMI 52.19 kg/m²      Physical Exam  Constitutional:       General: She is not in acute distress.     Appearance: She is well-developed. She is obese. She is not diaphoretic.      Comments: Pleasant   HENT:      Head: Normocephalic and atraumatic.   Eyes:      Pupils: Pupils are equal, round, and reactive to light.   Neck:      Thyroid: No thyromegaly.      Trachea: Phonation normal.   Cardiovascular:      Rate and Rhythm: Normal rate and regular rhythm.      Heart sounds: No murmur.   Pulmonary:      Effort: Pulmonary effort is normal. No respiratory distress.      Breath sounds: Normal breath sounds. No stridor. No rhonchi.   Abdominal:      General: There is no distension.      Tenderness: There is no abdominal tenderness.   Skin:     General: Skin is warm and dry.      Coloration: Skin is not jaundiced or pale.      Findings: No erythema.   Neurological:      Mental Status: She is alert. Mental status is at baseline.   Psychiatric:         Behavior: Behavior normal.         Thought " Content: Thought content normal.         Judgment: Judgment normal.         Assessment/Plan    Diagnoses and all orders for this visit:    1. Chronic cough (Primary)  -     Full Pulmonary Function Test & ABG Without Bronchodilator; Future  -     albuterol sulfate  (90 Base) MCG/ACT inhaler; Inhale 2 puffs Every 4 (Four) Hours As Needed for Wheezing.  Dispense: 18 g; Refill: 1    2. DDD (degenerative disc disease), lumbar    3. Fibromyalgia  -     gabapentin (NEURONTIN) 300 MG capsule; Take 2 capsules by mouth 3 (Three) Times a Day.  Dispense: 180 capsule; Refill: 2    4. Neuropathy  -     gabapentin (NEURONTIN) 300 MG capsule; Take 2 capsules by mouth 3 (Three) Times a Day.  Dispense: 180 capsule; Refill: 2    5. Hypothyroidism, unspecified type  -     levothyroxine (SYNTHROID, LEVOTHROID) 75 MCG tablet; Take 1 tablet by mouth Daily.  Dispense: 30 tablet; Refill: 5    6. Primary insomnia  -     amitriptyline (ELAVIL) 100 MG tablet; Take 1 tablet by mouth Every Night.  Dispense: 30 tablet; Refill: 5  -     zolpidem (AMBIEN) 10 MG tablet; Take 1 tablet by mouth At Night As Needed for Sleep.  Dispense: 30 tablet; Refill: 5    7. Gastroesophageal reflux disease  -     pantoprazole (Protonix) 40 MG EC tablet; Take 1 tablet by mouth Daily.  Dispense: 30 tablet; Refill: 5    8. Anxiety  -     ALPRAZolam (XANAX) 0.5 MG tablet; Take 1 tablet by mouth Daily As Needed for Anxiety.  Dispense: 30 tablet; Refill: 2      She needs further work-up for her chronic cough which is worsening.  Has not improved with Protonix.  She does not feel like it is postnasal drip for him as if she is allergies.  I like to obtain pulmonary function testing and start albuterol as needed.    For worsening pain I would like to start gabapentin 600 mg 3 times a day.  She will try 300 mg 3 times a day for the first week and then increase the dose.  We discussed referral to pain management but she would like to hold off for now.    Refill other  medications as above.    Follow-up in 4 months for recheck or sooner if indicated.  Discussed that we can continue ordering tests as needed, and I would like her to keep me abreast of her symptoms.    Return in about 4 months (around 5/18/2021) for Recheck.    Patient was given instructions and counseling regarding his/her condition or for health maintenance advice. Please see specific information pulled into the AVS if appropriate.     ELIZABETH Steen MD   15:42 CST  1/18/2021

## 2021-01-21 NOTE — TELEPHONE ENCOUNTER
Pt called and states the inhaler is over $45 and she cannot afford it. She was wondering if there was something else that could be sent in. Please advise.

## 2021-01-22 NOTE — TELEPHONE ENCOUNTER
PT CALLED REGARDING THE INHALER SHE HAD SPOKEN WITH SEAN ABOUT; PT SAID TO GO AHEAD AND SEND SCRIPT TO [facility]    [facility] [address] [place] [address] [phone] [facility] [phone] [facility] [phone] FX

## 2021-01-22 NOTE — TELEPHONE ENCOUNTER
Called and spoke with pt. She states she was going to call us back in a little while to give us the answer.

## 2021-04-08 NOTE — TELEPHONE ENCOUNTER
PATIENT HAS CALLED SHE IS ASKING IF THE DIAGNOSIS BORDERLINE RESTRICITVE VENTILATORY DEFECT COULD BE CAUSING YOUR HOARSNESS, COUGHING AND WHEEZING.  PLEASE ADVISE.

## 2021-04-08 NOTE — TELEPHONE ENCOUNTER
I would think so. I don't want to speak out of context because I don't see that she has that diagnosis. Thanks!

## 2021-04-14 NOTE — TELEPHONE ENCOUNTER
PATIENT HAS BEEN CALLED, SHE STATED THAT THE INHALER HAS NOT BEEN HELPING.  SHE STATED THAT SHE DOES GET A LITTLE SHORT OF BREATH IF SHE CHANGES THE SHEETS ON THE BED BUT ITS NOT BAD.  SHE STATED THAT IF IT GETS WORSE SHE WILL BE SEEN.

## 2021-08-11 NOTE — PROGRESS NOTES
Chief Complaint   Patient presents with   • Office Visit   • Cough     Chronic, worsening   • Dizziness   • Fatigue         History:  Ángela Connell is a 65 y.o. female who presents today for evaluation of the above problems.      She presents today for an acute visit.  She is having worsening of her chronic cough.  Previous work-up included x-ray of the chest which was unremarkable, pulmonary function testing which showed mild restriction, trial of albuterol and Protonix.  The albuterol Protonix has not changed her symptoms.  She has not tried any antihistamines.  At times she will get shortness of breath but denies any chest tightness or chest pain or palpitations.  No lower extremity edema    She also has fibromyalgia and degenerative disc disease.  Gabapentin has not helped her symptoms and she stopped taking it as such.    She also has vertigo.  She has been told about benign vertigo in the past.  Recently she rolled over in bed.  Experienced severe vertigo.  She does not have Epley maneuver information at home    She has also had fatigue and would like to have her screening for cancer receiving other illnesses    She does not smoke tobacco.    She has not had the COVID-19 vaccine    HPI    ROS:  Review of Systems   Constitutional: Negative for chills and fever.   HENT: Negative for ear pain, postnasal drip, rhinorrhea and sore throat.    Respiratory: Positive for cough, shortness of breath and wheezing.    Cardiovascular: Negative for chest pain.   Musculoskeletal: Positive for myalgias.   Skin: Negative for rash.   Neurological: Positive for headaches.         Current Outpatient Medications:   •  albuterol sulfate  (90 Base) MCG/ACT inhaler, Inhale 2 puffs Every 4 (Four) Hours As Needed for Wheezing., Disp: 18 g, Rfl: 1  •  ALPRAZolam (XANAX) 0.5 MG tablet, Take 1 tablet by mouth Daily As Needed for Anxiety., Disp: 30 tablet, Rfl: 2  •  amitriptyline (ELAVIL) 100 MG tablet, Take 1 tablet by mouth  Every Night., Disp: 30 tablet, Rfl: 5  •  levothyroxine (SYNTHROID, LEVOTHROID) 75 MCG tablet, Take 1 tablet by mouth Daily., Disp: 30 tablet, Rfl: 5  •  pantoprazole (Protonix) 40 MG EC tablet, Take 1 tablet by mouth Daily., Disp: 30 tablet, Rfl: 5  •  zolpidem (AMBIEN) 10 MG tablet, Take 1 tablet by mouth At Night As Needed for Sleep., Disp: 30 tablet, Rfl: 5    Lab Results   Component Value Date    GLUCOSE 96 08/17/2020    BUN 8 08/17/2020    CREATININE 0.91 08/17/2020    EGFRIFNONA 62 08/17/2020    BCR 8.8 08/17/2020    K 3.9 08/17/2020    CO2 25.3 08/17/2020    CALCIUM 9.1 08/17/2020    ALBUMIN 3.90 08/17/2020    AST 13 08/17/2020    ALT 16 08/17/2020       WBC   Date Value Ref Range Status   08/17/2020 5.16 3.40 - 10.80 10*3/mm3 Final     RBC   Date Value Ref Range Status   08/17/2020 5.02 3.77 - 5.28 10*6/mm3 Final     Hemoglobin   Date Value Ref Range Status   08/17/2020 14.3 12.0 - 15.9 g/dL Final     Hematocrit   Date Value Ref Range Status   08/17/2020 43.7 34.0 - 46.6 % Final     MCV   Date Value Ref Range Status   08/17/2020 87.1 79.0 - 97.0 fL Final     MCH   Date Value Ref Range Status   08/17/2020 28.5 26.6 - 33.0 pg Final     MCHC   Date Value Ref Range Status   08/17/2020 32.7 31.5 - 35.7 g/dL Final     RDW   Date Value Ref Range Status   08/17/2020 13.1 12.3 - 15.4 % Final     RDW-SD   Date Value Ref Range Status   08/17/2020 40.3 37.0 - 54.0 fl Final     MPV   Date Value Ref Range Status   08/17/2020 10.3 6.0 - 12.0 fL Final     Platelets   Date Value Ref Range Status   08/17/2020 285 140 - 450 10*3/mm3 Final     Neutrophil %   Date Value Ref Range Status   08/17/2020 71.2 42.7 - 76.0 % Final     Lymphocyte %   Date Value Ref Range Status   08/17/2020 17.2 (L) 19.6 - 45.3 % Final     Monocyte %   Date Value Ref Range Status   08/17/2020 8.9 5.0 - 12.0 % Final     Eosinophil %   Date Value Ref Range Status   08/17/2020 1.9 0.3 - 6.2 % Final     Basophil %   Date Value Ref Range Status   08/17/2020  "0.4 0.0 - 1.5 % Final     Immature Grans %   Date Value Ref Range Status   08/17/2020 0.4 0.0 - 0.5 % Final     Neutrophils, Absolute   Date Value Ref Range Status   08/17/2020 3.67 1.70 - 7.00 10*3/mm3 Final     Lymphocytes, Absolute   Date Value Ref Range Status   08/17/2020 0.89 0.70 - 3.10 10*3/mm3 Final     Monocytes, Absolute   Date Value Ref Range Status   08/17/2020 0.46 0.10 - 0.90 10*3/mm3 Final     Eosinophils, Absolute   Date Value Ref Range Status   08/17/2020 0.10 0.00 - 0.40 10*3/mm3 Final     Basophils, Absolute   Date Value Ref Range Status   08/17/2020 0.02 0.00 - 0.20 10*3/mm3 Final     Immature Grans, Absolute   Date Value Ref Range Status   08/17/2020 0.02 0.00 - 0.05 10*3/mm3 Final     nRBC   Date Value Ref Range Status   08/17/2020 0.0 0.0 - 0.2 /100 WBC Final         OBJECTIVE:  Visit Vitals  /70 (BP Location: Left arm, Patient Position: Sitting, Cuff Size: Adult)   Pulse 113   Temp 98.4 °F (36.9 °C) (Oral)   Resp 18   Ht 165.1 cm (65\")   Wt (!) 143 kg (316 lb 1.6 oz)   SpO2 96%   BMI 52.60 kg/m²      Physical Exam  Vitals reviewed.   Constitutional:       General: She is not in acute distress.     Appearance: Normal appearance. She is well-developed. She is not diaphoretic.      Comments: Pleasant   HENT:      Head: Normocephalic and atraumatic.      Right Ear: Tympanic membrane, ear canal and external ear normal. There is no impacted cerumen.      Left Ear: Tympanic membrane, ear canal and external ear normal. There is no impacted cerumen.   Eyes:      General: No scleral icterus.     Extraocular Movements: Extraocular movements intact.      Pupils: Pupils are equal, round, and reactive to light.      Comments: No nystagmus   Neck:      Thyroid: No thyromegaly.      Trachea: Phonation normal.   Cardiovascular:      Rate and Rhythm: Normal rate and regular rhythm.      Heart sounds: No murmur heard.     Pulmonary:      Effort: Pulmonary effort is normal. No respiratory distress.      " Breath sounds: Normal breath sounds. No stridor. No wheezing, rhonchi or rales.      Comments: Slightly decreased due to body habitus  Skin:     General: Skin is warm and dry.      Coloration: Skin is not jaundiced or pale.      Findings: No bruising or erythema.   Neurological:      General: No focal deficit present.      Mental Status: She is alert. Mental status is at baseline.      Cranial Nerves: No cranial nerve deficit.      Motor: No weakness.      Gait: Gait normal.   Psychiatric:         Mood and Affect: Mood normal.         Behavior: Behavior normal.         Thought Content: Thought content normal.         Judgment: Judgment normal.          [facility] - Pulmonary Function Test     [address]  [place]  [place]  [address]  [phone]     Patient : Ángela Connell   MRN : [id number]  CSN : [id number]  Pulmonologist : Suraj Finn MD  Date : 3/31/2021     ______________________________________________________________________     Interpretation :  1.  Spirometry is within normal limits, although the patient's FVC and FEV1 are both at the lower limits of normal.  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  2.  Lung volumes reveal a decrease in expiratory reserve volume and residual volume and a low normal total lung capacity and vital capacity, also suggestive of a borderline restrictive ventilatory defect.  3.  Diffusion capacity is within normal limits and when corrected for alveolar volume is supranormal.  4.  Arterial blood gases reveal mild hypoxemia on room air.      Assessment/Plan    Diagnoses and all orders for this visit:    1. Chronic cough (Primary)  -     CT chest hi resolution; Future    2. Restrictive lung disease  -     CT chest hi resolution; Future    3. Benign paroxysmal positional vertigo, unspecified laterality      I do think we need further evaluation given the borderline restrictive ventilatory defect and her chronic cough.  Going to order a  high-resolution CT scan.  I have also asked her to try over-the-counter Zyrtec nightly to see if her symptoms improve.    She has been given instructions for her benign positional vertigo     counseled on COVID-19 vaccine.  She has not received one at this time, but will think about getting the vaccine.      We will have her come back around 4 weeks for her Medicare wellness or sooner if indicated    30 minutes was spent total on the day of the visit    Return in about 4 weeks (around 9/8/2021) for Medicare Wellness.    Patient was given instructions and counseling regarding his/her condition or for health maintenance advice. Please see specific information pulled into the AVS if appropriate.     ELIZABETH Steen MD   15:29 CDT  8/11/2021

## 2021-08-11 NOTE — PATIENT INSTRUCTIONS
Benign Positional Vertigo  Vertigo is the feeling that you or your surroundings are moving when they are not. Benign positional vertigo is the most common form of vertigo. This is usually a harmless condition (benign). This condition is positional. This means that symptoms are triggered by certain movements and positions.  This condition can be dangerous if it occurs while you are doing something that could cause harm to you or others. This includes activities such as driving or operating machinery.  What are the causes?  The inner ear has fluid-filled canals that help your brain sense movement and balance. When the fluid moves, the brain receives messages about your body's position.  With benign positional vertigo, crystals in the inner ear break free and disturb the inner ear area. This causes your brain to receive confusing messages about your body's position.  What increases the risk?  You are more likely to develop this condition if:  · You are a woman.  · You are 50 years of age or older.  · You have recently had a head injury.  · You have an inner ear disease.  What are the signs or symptoms?  Symptoms of this condition usually happen when you move your head or your eyes in different directions. Symptoms may start suddenly, and usually last for less than a minute. They include:  · Loss of balance and falling.  · Feeling like you are spinning or moving.  · Feeling like your surroundings are spinning or moving.  · Nausea and vomiting.  · Blurred vision.  · Dizziness.  · Involuntary eye movement (nystagmus).  Symptoms can be mild and cause only minor problems, or they can be severe and interfere with daily life. Episodes of benign positional vertigo may return (recur) over time. Symptoms may improve over time.  How is this diagnosed?  This condition may be diagnosed based on:  · Your medical history.  · Physical exam of the head, neck, and ears.  · Positional tests to check for or stimulate vertigo. You may be  asked to turn your head and change positions, such as going from sitting to lying down. A health care provider will watch for symptoms of vertigo.  You may be referred to a health care provider who specializes in ear, nose, and throat problems (ENT, or otolaryngologist) or a provider who specializes in disorders of the nervous system (neurologist).  How is this treated?    This condition may be treated in a session in which your health care provider moves your head in specific positions to help the displaced crystals in your inner ear move. Treatment for this condition may take several sessions. Surgery may be needed in severe cases, but this is rare.  In some cases, benign positional vertigo may resolve on its own in 2-4 weeks.  Follow these instructions at home:  Safety  · Move slowly. Avoid sudden body or head movements or certain positions, as told by your health care provider.  · Avoid driving until your health care provider says it is safe for you to do so.  · Avoid operating heavy machinery until your health care provider says it is safe for you to do so.  · Avoid doing any tasks that would be dangerous to you or others if vertigo occurs.  · If you have trouble walking or keeping your balance, try using a cane for stability. If you feel dizzy or unstable, sit down right away.  · Return to your normal activities as told by your health care provider. Ask your health care provider what activities are safe for you.  General instructions  · Take over-the-counter and prescription medicines only as told by your health care provider.  · Drink enough fluid to keep your urine pale yellow.  · Keep all follow-up visits as told by your health care provider. This is important.  Contact a health care provider if:  · You have a fever.  · Your condition gets worse or you develop new symptoms.  · Your family or friends notice any behavioral changes.  · You have nausea or vomiting that gets worse.  · You have numbness or a  prickling and tingling sensation.  Get help right away if you:  · Have difficulty speaking or moving.  · Are always dizzy.  · Faint.  · Develop severe headaches.  · Have weakness in your legs or arms.  · Have changes in your hearing or vision.  · Develop a stiff neck.  · Develop sensitivity to light.  Summary  · Vertigo is the feeling that you or your surroundings are moving when they are not. Benign positional vertigo is the most common form of vertigo.  · This condition is caused by crystals in the inner ear that become displaced. This causes a disturbance in an area of the inner ear that helps your brain sense movement and balance.  · Symptoms include loss of balance and falling, feeling that you or your surroundings are moving, nausea and vomiting, and blurred vision.  · This condition can be diagnosed based on symptoms, a physical exam, and positional tests.  · Follow safety instructions as told by your health care provider. You will also be told when to contact your health care provider in case of problems.  This information is not intended to replace advice given to you by your health care provider. Make sure you discuss any questions you have with your health care provider.  Document Revised: 11/10/2020 Document Reviewed: 05/29/2019  Elsevier Patient Education © 2021 [employer]    How to Perform the Epley Maneuver  The Epley maneuver is an exercise that relieves symptoms of vertigo. Vertigo is the feeling that you or your surroundings are moving when they are not. When you feel vertigo, you may feel like the room is spinning and may have trouble walking. The Epley maneuver is used for a type of vertigo caused by a calcium deposit in a part of the inner ear. The maneuver involves changing head positions to help the deposit move out of the area.  You can do this maneuver at home whenever you have symptoms of vertigo. You can repeat it in 24 hours if your vertigo has not gone away.  Even though the Epley  maneuver may relieve your vertigo for a few weeks, it is possible that your symptoms will return. This maneuver relieves vertigo, but it does not relieve dizziness.  What are the risks?  If it is done correctly, the Epley maneuver is considered safe. Sometimes it can lead to dizziness or nausea that goes away after a short time. If you develop other symptoms--such as changes in vision, weakness, or numbness--stop doing the maneuver and call your health care provider.  Supplies needed:  · A bed or table.  · A pillow.  How to do the Epley maneuver         1. Sit on the edge of a bed or table with your back straight and your legs extended or hanging over the edge of the bed or table.  2. Turn your head residential toward the affected ear or side as told by your health care provider.  3. Lie backward quickly with your head turned until you are lying flat on your back. You may want to position a pillow under your shoulders.  4. Hold this position for at least 30 seconds. If you feel dizzy or have symptoms of vertigo, continue to hold the position until the symptoms stop.  5. Turn your head to the opposite direction until your unaffected ear is facing the floor.  6. Hold this position for at least 30 seconds. If you feel dizzy or have symptoms of vertigo, continue to hold the position until the symptoms stop.  7. Turn your whole body to the same side as your head so that you are positioned on your side. Your head will now be nearly facedown. Hold for at least 30 seconds. If you feel dizzy or have symptoms of vertigo, continue to hold the position until the symptoms stop.  8. Sit back up.  You can repeat the maneuver in 24 hours if your vertigo does not go away.  Follow these instructions at home:  For 24 hours after doing the Epley maneuver:  · Keep your head in an upright position.  · When lying down to sleep or rest, keep your head raised (elevated) with two or more pillows.  · Avoid excessive neck  movements.  Activity  · Do not drive or use machinery if you feel dizzy.  · After doing the Epley maneuver, return to your normal activities as told by your health care provider. Ask your health care provider what activities are safe for you.  General instructions  · Drink enough fluid to keep your urine pale yellow.  · Do not drink alcohol.  · Take over-the-counter and prescription medicines only as told by your health care provider.  · Keep all follow-up visits as told by your health care provider. This is important.  Preventing vertigo symptoms  Ask your health care provider if there is anything you should do at home to prevent vertigo. He or she may recommend that you:  · Keep your head elevated with two or more pillows while you sleep.  · Do not sleep on the side of your affected ear.  · Get up slowly from bed.  · Avoid sudden movements during the day.  · Avoid extreme head positions or movement, such as looking up or bending over.  Contact a health care provider if:  · Your vertigo gets worse.  · You have other symptoms, including:  ? Nausea.  ? Vomiting.  ? Headache.  Get help right away if you:  · Have vision changes.  · Have a headache or neck pain that is severe or getting worse.  · Cannot stop vomiting.  · Have new numbness or weakness in any part of your body.  Summary  · Vertigo is the feeling that you or your surroundings are moving when they are not.  · The Epley maneuver is an exercise that relieves symptoms of vertigo.  · If the Epley maneuver is done correctly, it is considered safe and relieves vertigo quickly.  This information is not intended to replace advice given to you by your health care provider. Make sure you discuss any questions you have with your health care provider.  Document Revised: 10/14/2020 Document Reviewed: 10/14/2020  Elsevier Patient Education © 2021 [employer]    Vertigo  Vertigo is the feeling that you or your surroundings are moving when they are not. This feeling can  come and go at any time. Vertigo often goes away on its own. Vertigo can be dangerous if it occurs while you are doing something that could endanger you or others, such as driving or operating machinery.  Your health care provider will do tests to determine the cause of your vertigo. Tests will also help your health care provider decide how best to treat your condition.  Follow these instructions at home:  Eating and drinking         · Drink enough fluid to keep your urine pale yellow.  · Do not drink alcohol.  Activity  · Return to your normal activities as told by your health care provider. Ask your health care provider what activities are safe for you.  · In the morning, first sit up on the side of the bed. When you feel okay, stand slowly while you hold onto something until you know that your balance is fine.  · Move slowly. Avoid sudden body or head movements or certain positions, as told by your health care provider.  · If you have trouble walking or keeping your balance, try using a cane for stability. If you feel dizzy or unstable, sit down right away.  · Avoid doing any tasks that would cause danger to you or others if vertigo occurs.  · Avoid bending down if you feel dizzy. Place items in your home so that they are easy for you to reach without leaning over.  · Do not drive or use heavy machinery if you feel dizzy.  General instructions  · Take over-the-counter and prescription medicines only as told by your health care provider.  · Keep all follow-up visits as told by your health care provider. This is important.  Contact a health care provider if:  · Your medicines do not relieve your vertigo or they make it worse.  · You have a fever.  · Your condition gets worse or you develop new symptoms.  · Your family or friends notice any behavioral changes.  · Your nausea or vomiting gets worse.  · You have numbness or a prickling and tingling sensation in part of your body.  Get help right away if you:  · Have  difficulty moving or speaking.  · Are always dizzy.  · Faint.  · Develop severe headaches.  · Have weakness in your hands, arms, or legs.  · Have changes in your hearing or vision.  · Develop a stiff neck.  · Develop sensitivity to light.  Summary  · Vertigo is the feeling that you or your surroundings are moving when they are not.  · Your health care provider will do tests to determine the cause of your vertigo.  · Follow instructions for home care. You may be told to avoid certain tasks, positions, or movements.  · Contact a health care provider if your medicines do not relieve your symptoms, or if you have a fever, nausea, vomiting, or changes in behavior.  · Get help right away if you have severe headaches or difficulty speaking, or you develop hearing or vision problems.  This information is not intended to replace advice given to you by your health care provider. Make sure you discuss any questions you have with your health care provider.  Document Revised: 11/11/2019 Document Reviewed: 11/11/2019  [employer] Patient Education © 2021 [employer]

## 2021-08-31 NOTE — TELEPHONE ENCOUNTER
Caller: AQUILINO CASAS    Relationship: Emergency Contact    Best call back number: [phone]     What is the best time to reach you: ANYTIME    Who are you requesting to speak with (clinical staff, provider,  specific staff member): CLINICAL STAFF    What was the call regarding: PATIENTS  CALLED REQUESTING A CALL BACK. PATIENT WOULD LIKE TO SCHEDULE A COVID ANTIBODY INFUSION.

## 2021-09-01 NOTE — PATIENT INSTRUCTIONS
Patient tolerated complete COVID infusion.  No signs/symptoms of medication reaction.      Patient given education on COVID and medication.  Patient verbalized understanding.  Pt taken to car in a wheelchair

## 2021-09-03 NOTE — ED NOTES
[facility] arrived per [facility] at this time with CPR in progress.      Nydia Reeves, RN  09/03/21 1393

## 2021-09-03 NOTE — ED NOTES
JULIAN called at this time. Spoke with Beata Almeida. Case #[id number]. Patient ruled out for organ donation     Nydia Reeves, RN  09/03/21 0033

## 2021-09-03 NOTE — ED PROVIDER NOTES
Emergency Medicine Provider Note    Subjective:    HISTORY OF PRESENT ILLNESS     Middle-age female presents via EMS in cardiac arrest.  EMS states that they were called for shortness of breath.  Patient reportedly had Covid.  Upon their arrival patient was minimally responsive.  Saturating in the 50s.  She lost pulses upon being taken to the ambulance.  Reported asystole throughout the entire transport.  Reported 30 minutes of CPR prior to arrival.  No rhythm other than asystole.  4 total amps of epinephrine were given.  I-gel was placed.  Humeral IO was placed for epinephrine.        History is obtained from EMS.       Review of Systems: Unable to obtain due to cardiac arrest.    Past Medical History:  No past medical history on file.    Allergies:  Not on File    Past Surgical History:  No past surgical history on file.    Family History:  No family history on file.    Social History:       Home Medications:  Prior to Admission medications    Not on File         Objective:    PHYSICAL EXAM     Vitals: There were no vitals filed for this visit.  GENERAL: Obese, unresponsive.  HEENT: Moist mucous membranes, oropharynx clear without lesions, exudates, thrush.  I gel in place.  EYES: Pupils dilated nonreactive.   NECK: Midline trachea  CARDIAC: pulseless  PULMONARY: Coarse breath sounds with bagging bilaterally  ABDOMINAL: Nondistended  MUSCULOSKELETAL: Atraumatic  NEUROLOGIC: Unresponsive  SKIN: Cyanotic  PSYCHIATRIC: Unable to assess    PROCEDURES     Intubation    Date/Time: 9/3/2021 11:53 AM  Performed by: Stephon Mohr MD  Authorized by: Stephon Mohr MD     Consent:     Consent obtained:  Emergent situation  Pre-procedure details:     Patient status:  Unresponsive    Mallampati score:  III    Pretreatment medications:  None    Paralytics:  None  Procedure details:     Preoxygenation:  DUSTIN/LMA    CPR in progress: yes      Intubation method:  Oral    Oral intubation technique:   Video-assisted    Laryngoscope blade:  Mac 4    Tube size (mm):  7.5    Tube type:  Cuffed    Number of attempts:  1    Ventilation between attempts: no      Cricoid pressure: no      Tube visualized through cords: yes    Placement assessment:     ETT to lip:  24    Tube secured with:  ETT busby    Breath sounds:  Equal and absent over the epigastrium    Placement verification: chest rise, condensation, direct visualization, equal breath sounds and ETCO2 detector    Post-procedure details:     Patient tolerance of procedure:  Tolerated well, no immediate complications  IO Line Insertion    Date/Time: 9/3/2021 11:56 AM  Performed by: Stephon Mohr MD  Authorized by: Stephon Mohr MD     Consent:     Consent obtained:  Emergent situation  Pre-procedure details:     Site preparation:  Alcohol  Procedure details:     Insertion site:  R proximal tibia    Insertion device:  Drill device    Insertion: Needle was inserted through the bony cortex      Number of attempts:  2    Insertion confirmation:  Aspiration of blood/marrow, easy infusion of fluids and stability of the needle  Post-procedure details:     Secured with:  Protective shield and transparent dressing    Patient tolerance of procedure:  Tolerated well, no immediate complications        LAB AND RADIOLOGY RESULTS     Lab Results (last 24 hours)     ** No results found for the last 24 hours. **          No results found.    ED course:    Medications - No data to display       Amount and/or complexity of data reviewed:      Risk of significant complications, morbidity, and/or mortality.    •  Presenting problem: high  •  Diagnostic procedures: high  •  Management options: high    Critical CARE time: I spent a total of 10 minutes of discontinuous critical care time evaluating this patient in cardiac arrest.  This includes talking to EMS, reviewing vital signs, administering vasoactive medications.    MEDICAL DECISION MAKING     Patient  presents in cardiac arrest.  Upon arrival CPR is ongoing.  Humeral IO has been displaced.  I have concern for displaced LMA.  Due to the history of Covid I proceed with intubation without medications as I believe this was likely a hypoxic arrest. tube is visualized through the cords.  Quantitative end-tidal CO2 captures and shows good waveform.  Bilateral breath sounds.  Unable to get a chest x-ray due to ongoing CPR.  I placed a right tibial IO.  Epinephrine is given through this.  ACLS is followed.  Patient undergoes CPR here for a total of 10 minutes.  At this time she has undergone 40 minutes of CPR.  She has received a total of 6 rounds of epinephrine including those given by EMS.  I feel her chance of survival with many reasonable neurologic outcome is almost nonexistent.  I have terminated resuscitation.  No breath sounds.  No pulse.        Diagnosis:    Final diagnoses:   Cardiac arrest (CMS/HCC)         ED Disposition:     ED Disposition     ED Disposition Condition Comment                No follow-up provider specified.       Medication List      No changes were made to your prescriptions during this visit.              Stephon Mohr MD  [phone]

## 2021-09-03 NOTE — ED TRIAGE NOTES
Patient arrived per [facility] ems with CPR in progress. EMS states upon their arrival patient was alert and oriented and talking to patient but very short of breath. When they loaded patient into the truck patient went unresponsive and lost a pulse. EMS gave 4 epi en route. No defib. Patient is covid positive. Unknown of when she tested positive. EMS states they started CPR around 11AM and arrived here at 1130AM.

## 2021-10-06 ENCOUNTER — HOSPITAL ENCOUNTER (OUTPATIENT)
Dept: SLEEP MEDICINE | Facility: HOSPITAL | Age: 66
End: 2021-10-06

## 2023-03-03 NOTE — TELEPHONE ENCOUNTER
Pt notified of normal pap and to keep endo bx appointment on 12-16-20. BS   You may receive a survey about your visit with us today. The feedback from our patients helps us identify what is working well and where the service to all patients can be enhanced. Thank you!